# Patient Record
Sex: FEMALE | Race: BLACK OR AFRICAN AMERICAN | NOT HISPANIC OR LATINO | Employment: FULL TIME | URBAN - METROPOLITAN AREA
[De-identification: names, ages, dates, MRNs, and addresses within clinical notes are randomized per-mention and may not be internally consistent; named-entity substitution may affect disease eponyms.]

---

## 2024-03-28 ENCOUNTER — LAB (OUTPATIENT)
Facility: HOSPITAL | Age: 54
End: 2024-03-28
Payer: COMMERCIAL

## 2024-03-28 ENCOUNTER — OFFICE VISIT (OUTPATIENT)
Dept: INTERNAL MEDICINE | Facility: CLINIC | Age: 54
End: 2024-03-28
Payer: COMMERCIAL

## 2024-03-28 VITALS
SYSTOLIC BLOOD PRESSURE: 134 MMHG | TEMPERATURE: 98.1 F | DIASTOLIC BLOOD PRESSURE: 88 MMHG | WEIGHT: 206 LBS | BODY MASS INDEX: 32.33 KG/M2 | HEIGHT: 67 IN

## 2024-03-28 DIAGNOSIS — Z12.31 ENCOUNTER FOR SCREENING MAMMOGRAM FOR MALIGNANT NEOPLASM OF BREAST: Primary | ICD-10-CM

## 2024-03-28 DIAGNOSIS — R53.83 FATIGUE, UNSPECIFIED TYPE: ICD-10-CM

## 2024-03-28 DIAGNOSIS — I10 PRIMARY HYPERTENSION: ICD-10-CM

## 2024-03-28 DIAGNOSIS — Z11.59 NEED FOR HEPATITIS C SCREENING TEST: ICD-10-CM

## 2024-03-28 DIAGNOSIS — Z00.00 HEALTHCARE MAINTENANCE: ICD-10-CM

## 2024-03-28 DIAGNOSIS — Z12.11 ENCOUNTER FOR SCREENING FOR MALIGNANT NEOPLASM OF COLON: ICD-10-CM

## 2024-03-28 LAB
ALBUMIN SERPL-MCNC: 4.5 G/DL (ref 3.5–5.2)
ALBUMIN/GLOB SERPL: 1.2 G/DL
ALP SERPL-CCNC: 134 U/L (ref 39–117)
ALT SERPL W P-5'-P-CCNC: 25 U/L (ref 1–33)
ANION GAP SERPL CALCULATED.3IONS-SCNC: 9.8 MMOL/L (ref 5–15)
AST SERPL-CCNC: 26 U/L (ref 1–32)
BASOPHILS # BLD AUTO: 0.02 10*3/MM3 (ref 0–0.2)
BASOPHILS NFR BLD AUTO: 0.2 % (ref 0–1.5)
BILIRUB SERPL-MCNC: 0.3 MG/DL (ref 0–1.2)
BUN SERPL-MCNC: 8 MG/DL (ref 6–20)
BUN/CREAT SERPL: 10.4 (ref 7–25)
CALCIUM SPEC-SCNC: 9.8 MG/DL (ref 8.6–10.5)
CHLORIDE SERPL-SCNC: 99 MMOL/L (ref 98–107)
CHOLEST SERPL-MCNC: 297 MG/DL (ref 0–200)
CO2 SERPL-SCNC: 31.2 MMOL/L (ref 22–29)
CREAT SERPL-MCNC: 0.77 MG/DL (ref 0.57–1)
DEPRECATED RDW RBC AUTO: 42.1 FL (ref 37–54)
EGFRCR SERPLBLD CKD-EPI 2021: 91.8 ML/MIN/1.73
EOSINOPHIL # BLD AUTO: 0.09 10*3/MM3 (ref 0–0.4)
EOSINOPHIL NFR BLD AUTO: 1.1 % (ref 0.3–6.2)
ERYTHROCYTE [DISTWIDTH] IN BLOOD BY AUTOMATED COUNT: 13.2 % (ref 12.3–15.4)
GLOBULIN UR ELPH-MCNC: 3.8 GM/DL
GLUCOSE SERPL-MCNC: 113 MG/DL (ref 65–99)
HBA1C MFR BLD: 6.5 % (ref 4.8–5.6)
HCT VFR BLD AUTO: 43.4 % (ref 34–46.6)
HCV AB SER DONR QL: NORMAL
HDLC SERPL-MCNC: 72 MG/DL (ref 40–60)
HGB BLD-MCNC: 13.5 G/DL (ref 12–15.9)
IMM GRANULOCYTES # BLD AUTO: 0.02 10*3/MM3 (ref 0–0.05)
IMM GRANULOCYTES NFR BLD AUTO: 0.2 % (ref 0–0.5)
LDLC SERPL CALC-MCNC: 201 MG/DL (ref 0–100)
LDLC/HDLC SERPL: 2.76 {RATIO}
LYMPHOCYTES # BLD AUTO: 3.08 10*3/MM3 (ref 0.7–3.1)
LYMPHOCYTES NFR BLD AUTO: 36.4 % (ref 19.6–45.3)
MCH RBC QN AUTO: 26.9 PG (ref 26.6–33)
MCHC RBC AUTO-ENTMCNC: 31.1 G/DL (ref 31.5–35.7)
MCV RBC AUTO: 86.6 FL (ref 79–97)
MONOCYTES # BLD AUTO: 0.44 10*3/MM3 (ref 0.1–0.9)
MONOCYTES NFR BLD AUTO: 5.2 % (ref 5–12)
NEUTROPHILS NFR BLD AUTO: 4.8 10*3/MM3 (ref 1.7–7)
NEUTROPHILS NFR BLD AUTO: 56.9 % (ref 42.7–76)
NRBC BLD AUTO-RTO: 0 /100 WBC (ref 0–0.2)
PLATELET # BLD AUTO: 219 10*3/MM3 (ref 140–450)
PMV BLD AUTO: 12.6 FL (ref 6–12)
POTASSIUM SERPL-SCNC: 3.3 MMOL/L (ref 3.5–5.2)
PROT SERPL-MCNC: 8.3 G/DL (ref 6–8.5)
RBC # BLD AUTO: 5.01 10*6/MM3 (ref 3.77–5.28)
SODIUM SERPL-SCNC: 140 MMOL/L (ref 136–145)
TRIGL SERPL-MCNC: 132 MG/DL (ref 0–150)
TSH SERPL DL<=0.05 MIU/L-ACNC: 2.92 UIU/ML (ref 0.27–4.2)
VLDLC SERPL-MCNC: 24 MG/DL (ref 5–40)
WBC NRBC COR # BLD AUTO: 8.45 10*3/MM3 (ref 3.4–10.8)

## 2024-03-28 PROCEDURE — 36415 COLL VENOUS BLD VENIPUNCTURE: CPT | Performed by: STUDENT IN AN ORGANIZED HEALTH CARE EDUCATION/TRAINING PROGRAM

## 2024-03-28 PROCEDURE — 80050 GENERAL HEALTH PANEL: CPT | Performed by: STUDENT IN AN ORGANIZED HEALTH CARE EDUCATION/TRAINING PROGRAM

## 2024-03-28 PROCEDURE — 86803 HEPATITIS C AB TEST: CPT | Performed by: STUDENT IN AN ORGANIZED HEALTH CARE EDUCATION/TRAINING PROGRAM

## 2024-03-28 PROCEDURE — 80061 LIPID PANEL: CPT | Performed by: STUDENT IN AN ORGANIZED HEALTH CARE EDUCATION/TRAINING PROGRAM

## 2024-03-28 PROCEDURE — 83036 HEMOGLOBIN GLYCOSYLATED A1C: CPT | Performed by: STUDENT IN AN ORGANIZED HEALTH CARE EDUCATION/TRAINING PROGRAM

## 2024-03-28 RX ORDER — HYDROCHLOROTHIAZIDE 12.5 MG/1
12.5 TABLET ORAL DAILY
Qty: 30 TABLET | Refills: 1 | Status: SHIPPED | OUTPATIENT
Start: 2024-03-28 | End: 2024-04-01 | Stop reason: SDUPTHER

## 2024-03-28 NOTE — PROGRESS NOTES
"Chief Complaint  Establish Care, Hypertension, and Fatigue (Since starting medicine//sleeping a lot and deeply)    Subjective        Any Mukendi presents to [facility]  History of Present Illness  #HTN  Recently seen in the emergency department in the setting of elevated BP, started on amlodipine 10, hctz 25. Sicne intiation has been feeling more fatigued, awakens without feeling rested. Doesn't have nocturnal disturbances, but does snore. No syncope    Objective   Vital Signs:  /88   Temp 98.1 °F (36.7 °C)   Ht 170.2 cm (67\")   Wt 93.4 kg (206 lb)   BMI 32.26 kg/m²   Estimated body mass index is 32.26 kg/m² as calculated from the following:    Height as of this encounter: 170.2 cm (67\").    Weight as of this encounter: 93.4 kg (206 lb).             Physical Exam  Vitals reviewed.   Constitutional:       General: She is not in acute distress.     Appearance: Normal appearance.   HENT:      Head: Normocephalic and atraumatic.   Eyes:      General: No scleral icterus.     Extraocular Movements: Extraocular movements intact.      Pupils: Pupils are equal, round, and reactive to light.   Cardiovascular:      Rate and Rhythm: Normal rate and regular rhythm.      Heart sounds: No murmur heard.     No friction rub. No gallop.   Pulmonary:      Effort: Pulmonary effort is normal.      Breath sounds: Normal breath sounds. No wheezing, rhonchi or rales.   Abdominal:      General: Abdomen is flat. Bowel sounds are normal. There is no distension.      Palpations: Abdomen is soft.      Tenderness: There is no abdominal tenderness. There is no guarding.   Musculoskeletal:         General: Normal range of motion.      Right lower leg: No edema.      Left lower leg: No edema.   Skin:     General: Skin is warm and dry.      Capillary Refill: Capillary refill takes less than 2 seconds.      Coloration: Skin is not jaundiced.      Findings: No rash.   Neurological:      General: No focal " deficit present.      Mental Status: She is alert and oriented to person, place, and time.   Psychiatric:         Mood and Affect: Mood normal.         Behavior: Behavior normal.        Result Review :                     Assessment and Plan     Diagnoses and all orders for this visit:    1. Encounter for screening mammogram for malignant neoplasm of breast (Primary)  -     Mammo Screening Digital Tomosynthesis Bilateral With CAD; Future    2. Encounter for screening for malignant neoplasm of colon    3. Need for hepatitis C screening test  -     Hepatitis C Antibody    4. Healthcare maintenance  -     Ambulatory Referral to Obstetrics / Gynecology  -     Hemoglobin A1c  -     Lipid Panel  -     Comprehensive Metabolic Panel  -     CBC & Differential    5. Fatigue, unspecified type  -     TSH    6. Primary hypertension  -     hydroCHLOROthiazide 12.5 MG tablet; Take 1 tablet by mouth Daily.  Dispense: 30 tablet; Refill: 1      Pleasant 53 lingala/Pakistani speaking patient here to establish care. Due for mammo, pap, will send those referrals today. Recently started on antihypertensives, now feeling significant fatigue. Bp at goal today, however will reduce dose of hctz to 12.5 for one month, and recheck bp following. Should continue to be htn, will increase back to 25, however optimistic she will feel less fatgiued with a gradual transition. Will obtain orutine labs, tsh for her fatigue. Counseled on healthy diet, sleep, exercise.  Patient will target 6 to 8 hours of sleep nightly, 150 minutes moderate intensity exercise 3 times weekly, robust diet with plenty of fruits vegetables, lean proteins.  Avoid sugary processed fatty foods. May need sleep study in future.       Follow Up     No follow-ups on file.  Patient was given instructions and counseling regarding her condition or for health maintenance advice. Please see specific information pulled into the AVS if appropriate.

## 2024-04-01 ENCOUNTER — TELEPHONE (OUTPATIENT)
Dept: INTERNAL MEDICINE | Facility: CLINIC | Age: 54
End: 2024-04-01
Payer: COMMERCIAL

## 2024-04-01 DIAGNOSIS — I10 PRIMARY HYPERTENSION: ICD-10-CM

## 2024-04-01 RX ORDER — AMLODIPINE BESYLATE 10 MG/1
10 TABLET ORAL DAILY
Qty: 30 TABLET | Refills: 0 | Status: SHIPPED | OUTPATIENT
Start: 2024-04-01 | End: 2024-04-01 | Stop reason: SDUPTHER

## 2024-04-01 RX ORDER — HYDROCHLOROTHIAZIDE 12.5 MG/1
12.5 TABLET ORAL DAILY
Qty: 30 TABLET | Refills: 1 | Status: SHIPPED | OUTPATIENT
Start: 2024-04-01 | End: 2024-04-02 | Stop reason: SDUPTHER

## 2024-04-01 RX ORDER — HYDROCHLOROTHIAZIDE 12.5 MG/1
12.5 TABLET ORAL DAILY
Qty: 30 TABLET | Refills: 1 | Status: SHIPPED | OUTPATIENT
Start: 2024-04-01 | End: 2024-04-01 | Stop reason: SDUPTHER

## 2024-04-01 RX ORDER — AMLODIPINE BESYLATE 10 MG/1
10 TABLET ORAL DAILY
Qty: 30 TABLET | Refills: 1 | Status: SHIPPED | OUTPATIENT
Start: 2024-04-01 | End: 2024-04-02 | Stop reason: SDUPTHER

## 2024-04-01 NOTE — TELEPHONE ENCOUNTER
Called patients son to notifying him medications were sent to [facility] as requested

## 2024-04-02 ENCOUNTER — TELEPHONE (OUTPATIENT)
Dept: INTERNAL MEDICINE | Facility: CLINIC | Age: 54
End: 2024-04-02
Payer: COMMERCIAL

## 2024-04-02 DIAGNOSIS — I10 PRIMARY HYPERTENSION: ICD-10-CM

## 2024-04-02 RX ORDER — HYDROCHLOROTHIAZIDE 12.5 MG/1
12.5 TABLET ORAL DAILY
Qty: 30 TABLET | Refills: 1 | Status: SHIPPED | OUTPATIENT
Start: 2024-04-02

## 2024-04-02 RX ORDER — AMLODIPINE BESYLATE 10 MG/1
10 TABLET ORAL DAILY
Qty: 30 TABLET | Refills: 1 | Status: SHIPPED | OUTPATIENT
Start: 2024-04-02

## 2024-04-02 NOTE — TELEPHONE ENCOUNTER
Patient is downstairs at the pharmacy ([facility]) and is wanting to know if her prescriptions can be sent to that pharmacy so she can pick them up while she is here, please advise?

## 2024-04-02 NOTE — TELEPHONE ENCOUNTER
Ignore the last message on this patient, Dr. Niño already sent prescription to [facility] [place], Thanks.

## 2024-04-03 ENCOUNTER — TELEPHONE (OUTPATIENT)
Dept: OBSTETRICS AND GYNECOLOGY | Age: 54
End: 2024-04-03
Payer: COMMERCIAL

## 2024-04-03 NOTE — TELEPHONE ENCOUNTER
Caller: Sarah, Any    Relationship to patient: Self    Best call back number: [phone]     Type of visit: ANNUAL/ MAMMORGRAM    Additional notes:CALLED TO SCHEDULE NEW [facility]YN FOR ANNUAL APPT ON 7/12/24. SENDING TE FOR MAMMOGRAM PER [facility] REFERENCE TOOL. ORDERS FOR MAMMOGRAM HAVE BEEN PLACED BY DR. ALLEN(REFERRING PROVIDER).

## 2024-04-04 ENCOUNTER — TELEPHONE (OUTPATIENT)
Dept: INTERNAL MEDICINE | Facility: CLINIC | Age: 54
End: 2024-04-04
Payer: COMMERCIAL

## 2024-04-04 NOTE — TELEPHONE ENCOUNTER
Caller: LUIS ARMANDO HENSON    Relationship: Child    Best call back number:     [phone]     What is the best time to reach you: ANYTIME    Who are you requesting to speak with (clinical staff, provider,  specific staff member):     What was the call regarding: PATIENTS SON CALLING TO DISCUSS PATIENTS HEALTH NO BH VERBAL ON FILE.    Is it okay if the provider responds through MyChart: NO